# Patient Record
Sex: MALE | Race: WHITE | NOT HISPANIC OR LATINO | Employment: FULL TIME | URBAN - METROPOLITAN AREA
[De-identification: names, ages, dates, MRNs, and addresses within clinical notes are randomized per-mention and may not be internally consistent; named-entity substitution may affect disease eponyms.]

---

## 2022-02-20 ENCOUNTER — HOSPITAL ENCOUNTER (EMERGENCY)
Facility: HOSPITAL | Age: 32
Discharge: HOME | End: 2022-02-20
Attending: SURGERY
Payer: COMMERCIAL

## 2022-02-20 ENCOUNTER — APPOINTMENT (EMERGENCY)
Dept: CT IMAGING | Facility: HOSPITAL | Age: 32
End: 2022-02-20
Payer: COMMERCIAL

## 2022-02-20 ENCOUNTER — APPOINTMENT (EMERGENCY)
Dept: RADIOLOGY | Facility: HOSPITAL | Age: 32
End: 2022-02-20
Payer: COMMERCIAL

## 2022-02-20 ENCOUNTER — ANESTHESIA (EMERGENCY)
Dept: PERIOP | Facility: HOSPITAL | Age: 32
End: 2022-02-20
Payer: COMMERCIAL

## 2022-02-20 ENCOUNTER — ANESTHESIA EVENT (EMERGENCY)
Dept: PERIOP | Facility: HOSPITAL | Age: 32
End: 2022-02-20
Payer: COMMERCIAL

## 2022-02-20 VITALS
RESPIRATION RATE: 18 BRPM | SYSTOLIC BLOOD PRESSURE: 118 MMHG | TEMPERATURE: 97.9 F | HEART RATE: 55 BPM | OXYGEN SATURATION: 98 % | WEIGHT: 222.22 LBS | DIASTOLIC BLOOD PRESSURE: 63 MMHG

## 2022-02-20 DIAGNOSIS — S02.2XXA FRACTURE OF NASAL BONES, INITIAL ENCOUNTER FOR CLOSED FRACTURE: Primary | ICD-10-CM

## 2022-02-20 DIAGNOSIS — S09.93XA FACIAL INJURY, INITIAL ENCOUNTER: ICD-10-CM

## 2022-02-20 LAB
BASE EXCESS BLDA CALC-SCNC: 2 MMOL/L (ref -2–3)
GLUCOSE SERPL-MCNC: 135 MG/DL (ref 65–140)
HCO3 BLDA-SCNC: 26 MMOL/L (ref 24–30)
HCT VFR BLD CALC: 44 % (ref 36.5–49.3)
HGB BLDA-MCNC: 15 G/DL (ref 12–17)
PCO2 BLD: 27 MMOL/L (ref 21–32)
PCO2 BLD: 36.8 MM HG (ref 42–50)
PH BLD: 7.46 [PH] (ref 7.3–7.4)
PO2 BLD: 24 MM HG (ref 35–45)
POTASSIUM BLD-SCNC: 4 MMOL/L (ref 3.5–5.3)
SAO2 % BLD FROM PO2: 46 % (ref 60–85)
SODIUM BLD-SCNC: 142 MMOL/L (ref 136–145)
SPECIMEN SOURCE: ABNORMAL

## 2022-02-20 PROCEDURE — 82803 BLOOD GASES ANY COMBINATION: CPT

## 2022-02-20 PROCEDURE — 85014 HEMATOCRIT: CPT

## 2022-02-20 PROCEDURE — 84295 ASSAY OF SERUM SODIUM: CPT

## 2022-02-20 PROCEDURE — 84132 ASSAY OF SERUM POTASSIUM: CPT

## 2022-02-20 PROCEDURE — 82947 ASSAY GLUCOSE BLOOD QUANT: CPT

## 2022-02-20 PROCEDURE — 96361 HYDRATE IV INFUSION ADD-ON: CPT

## 2022-02-20 PROCEDURE — 99284 EMERGENCY DEPT VISIT MOD MDM: CPT | Performed by: SURGERY

## 2022-02-20 PROCEDURE — 96374 THER/PROPH/DIAG INJ IV PUSH: CPT

## 2022-02-20 PROCEDURE — 70486 CT MAXILLOFACIAL W/O DYE: CPT

## 2022-02-20 PROCEDURE — 99284 EMERGENCY DEPT VISIT MOD MDM: CPT

## 2022-02-20 PROCEDURE — 70450 CT HEAD/BRAIN W/O DYE: CPT

## 2022-02-20 RX ORDER — MIDAZOLAM HYDROCHLORIDE 2 MG/2ML
INJECTION, SOLUTION INTRAMUSCULAR; INTRAVENOUS AS NEEDED
Status: DISCONTINUED | OUTPATIENT
Start: 2022-02-20 | End: 2022-02-20

## 2022-02-20 RX ORDER — NEOSTIGMINE METHYLSULFATE 1 MG/ML
INJECTION INTRAVENOUS AS NEEDED
Status: DISCONTINUED | OUTPATIENT
Start: 2022-02-20 | End: 2022-02-20

## 2022-02-20 RX ORDER — LIDOCAINE HYDROCHLORIDE 10 MG/ML
INJECTION, SOLUTION EPIDURAL; INFILTRATION; INTRACAUDAL; PERINEURAL AS NEEDED
Status: DISCONTINUED | OUTPATIENT
Start: 2022-02-20 | End: 2022-02-20

## 2022-02-20 RX ORDER — FENTANYL CITRATE/PF 50 MCG/ML
25 SYRINGE (ML) INJECTION
Status: DISCONTINUED | OUTPATIENT
Start: 2022-02-20 | End: 2022-02-20 | Stop reason: HOSPADM

## 2022-02-20 RX ORDER — SODIUM CHLORIDE, SODIUM LACTATE, POTASSIUM CHLORIDE, CALCIUM CHLORIDE 600; 310; 30; 20 MG/100ML; MG/100ML; MG/100ML; MG/100ML
100 INJECTION, SOLUTION INTRAVENOUS CONTINUOUS
Status: DISCONTINUED | OUTPATIENT
Start: 2022-02-20 | End: 2022-02-20 | Stop reason: HOSPADM

## 2022-02-20 RX ORDER — ONDANSETRON 2 MG/ML
4 INJECTION INTRAMUSCULAR; INTRAVENOUS ONCE AS NEEDED
Status: DISCONTINUED | OUTPATIENT
Start: 2022-02-20 | End: 2022-02-20 | Stop reason: HOSPADM

## 2022-02-20 RX ORDER — FENTANYL CITRATE 50 UG/ML
INJECTION, SOLUTION INTRAMUSCULAR; INTRAVENOUS AS NEEDED
Status: DISCONTINUED | OUTPATIENT
Start: 2022-02-20 | End: 2022-02-20

## 2022-02-20 RX ORDER — MAGNESIUM HYDROXIDE 1200 MG/15ML
LIQUID ORAL AS NEEDED
Status: DISCONTINUED | OUTPATIENT
Start: 2022-02-20 | End: 2022-02-20 | Stop reason: HOSPADM

## 2022-02-20 RX ORDER — AMOXICILLIN AND CLAVULANATE POTASSIUM 875; 125 MG/1; MG/1
1 TABLET, FILM COATED ORAL EVERY 12 HOURS SCHEDULED
Qty: 14 TABLET | Refills: 0 | Status: SHIPPED | OUTPATIENT
Start: 2022-02-20 | End: 2022-02-27

## 2022-02-20 RX ORDER — KETOROLAC TROMETHAMINE 30 MG/ML
INJECTION, SOLUTION INTRAMUSCULAR; INTRAVENOUS AS NEEDED
Status: DISCONTINUED | OUTPATIENT
Start: 2022-02-20 | End: 2022-02-20

## 2022-02-20 RX ORDER — GLYCOPYRROLATE 0.2 MG/ML
INJECTION INTRAMUSCULAR; INTRAVENOUS AS NEEDED
Status: DISCONTINUED | OUTPATIENT
Start: 2022-02-20 | End: 2022-02-20

## 2022-02-20 RX ORDER — ACETAMINOPHEN 325 MG/1
975 TABLET ORAL EVERY 8 HOURS PRN
Refills: 0 | Status: CANCELLED
Start: 2022-02-20

## 2022-02-20 RX ORDER — PROPOFOL 10 MG/ML
INJECTION, EMULSION INTRAVENOUS AS NEEDED
Status: DISCONTINUED | OUTPATIENT
Start: 2022-02-20 | End: 2022-02-20

## 2022-02-20 RX ORDER — IBUPROFEN 600 MG/1
600 TABLET ORAL EVERY 6 HOURS PRN
Qty: 30 TABLET | Refills: 0
Start: 2022-02-20 | End: 2022-03-02

## 2022-02-20 RX ORDER — ONDANSETRON 2 MG/ML
4 INJECTION INTRAMUSCULAR; INTRAVENOUS EVERY 6 HOURS PRN
Status: DISCONTINUED | OUTPATIENT
Start: 2022-02-20 | End: 2022-02-20 | Stop reason: HOSPADM

## 2022-02-20 RX ORDER — DEXAMETHASONE SODIUM PHOSPHATE 10 MG/ML
INJECTION, SOLUTION INTRAMUSCULAR; INTRAVENOUS AS NEEDED
Status: DISCONTINUED | OUTPATIENT
Start: 2022-02-20 | End: 2022-02-20

## 2022-02-20 RX ORDER — ACETAMINOPHEN 325 MG/1
975 TABLET ORAL ONCE
Status: COMPLETED | OUTPATIENT
Start: 2022-02-20 | End: 2022-02-20

## 2022-02-20 RX ORDER — DEXMEDETOMIDINE HYDROCHLORIDE 100 UG/ML
INJECTION, SOLUTION INTRAVENOUS AS NEEDED
Status: DISCONTINUED | OUTPATIENT
Start: 2022-02-20 | End: 2022-02-20

## 2022-02-20 RX ORDER — ONDANSETRON 2 MG/ML
INJECTION INTRAMUSCULAR; INTRAVENOUS AS NEEDED
Status: DISCONTINUED | OUTPATIENT
Start: 2022-02-20 | End: 2022-02-20

## 2022-02-20 RX ORDER — ROCURONIUM BROMIDE 10 MG/ML
INJECTION, SOLUTION INTRAVENOUS AS NEEDED
Status: DISCONTINUED | OUTPATIENT
Start: 2022-02-20 | End: 2022-02-20

## 2022-02-20 RX ORDER — OXYMETAZOLINE HYDROCHLORIDE 0.05 G/100ML
SPRAY NASAL AS NEEDED
Status: DISCONTINUED | OUTPATIENT
Start: 2022-02-20 | End: 2022-02-20 | Stop reason: HOSPADM

## 2022-02-20 RX ORDER — LIDOCAINE HYDROCHLORIDE AND EPINEPHRINE 10; 10 MG/ML; UG/ML
INJECTION, SOLUTION INFILTRATION; PERINEURAL AS NEEDED
Status: DISCONTINUED | OUTPATIENT
Start: 2022-02-20 | End: 2022-02-20 | Stop reason: HOSPADM

## 2022-02-20 RX ADMIN — SODIUM CHLORIDE, SODIUM LACTATE, POTASSIUM CHLORIDE, AND CALCIUM CHLORIDE: .6; .31; .03; .02 INJECTION, SOLUTION INTRAVENOUS at 17:00

## 2022-02-20 RX ADMIN — MIDAZOLAM HYDROCHLORIDE 2 MG: 1 INJECTION, SOLUTION INTRAMUSCULAR; INTRAVENOUS at 16:57

## 2022-02-20 RX ADMIN — PROPOFOL 50 MG: 10 INJECTION, EMULSION INTRAVENOUS at 17:05

## 2022-02-20 RX ADMIN — KETOROLAC TROMETHAMINE 30 MG: 30 INJECTION, SOLUTION INTRAMUSCULAR at 17:26

## 2022-02-20 RX ADMIN — FENTANYL CITRATE 50 MCG: 50 INJECTION, SOLUTION INTRAMUSCULAR; INTRAVENOUS at 17:03

## 2022-02-20 RX ADMIN — DEXMEDETOMIDINE HYDROCHLORIDE 8 MCG: 100 INJECTION, SOLUTION INTRAVENOUS at 17:08

## 2022-02-20 RX ADMIN — ACETAMINOPHEN 975 MG: 325 TABLET, FILM COATED ORAL at 18:44

## 2022-02-20 RX ADMIN — DEXAMETHASONE SODIUM PHOSPHATE 10 MG: 10 INJECTION, SOLUTION INTRAMUSCULAR; INTRAVENOUS at 17:08

## 2022-02-20 RX ADMIN — PROPOFOL 50 MG: 10 INJECTION, EMULSION INTRAVENOUS at 17:31

## 2022-02-20 RX ADMIN — ONDANSETRON 4 MG: 2 INJECTION INTRAMUSCULAR; INTRAVENOUS at 17:09

## 2022-02-20 RX ADMIN — PROPOFOL 200 MG: 10 INJECTION, EMULSION INTRAVENOUS at 17:03

## 2022-02-20 RX ADMIN — SODIUM CHLORIDE, SODIUM LACTATE, POTASSIUM CHLORIDE, AND CALCIUM CHLORIDE 100 ML/HR: .6; .31; .03; .02 INJECTION, SOLUTION INTRAVENOUS at 13:25

## 2022-02-20 RX ADMIN — NEOSTIGMINE METHYLSULFATE 5 MG: 1 INJECTION INTRAVENOUS at 17:31

## 2022-02-20 RX ADMIN — ROCURONIUM BROMIDE 30 MG: 10 SOLUTION INTRAVENOUS at 17:03

## 2022-02-20 RX ADMIN — GLYCOPYRROLATE 0.6 MG: 0.2 INJECTION, SOLUTION INTRAMUSCULAR; INTRAVENOUS at 17:31

## 2022-02-20 RX ADMIN — FENTANYL CITRATE 50 MCG: 50 INJECTION, SOLUTION INTRAMUSCULAR; INTRAVENOUS at 17:06

## 2022-02-20 RX ADMIN — LIDOCAINE HYDROCHLORIDE 80 MG: 10 INJECTION, SOLUTION EPIDURAL; INFILTRATION; INTRACAUDAL; PERINEURAL at 17:03

## 2022-02-20 RX ADMIN — SODIUM CHLORIDE 3 G: 9 INJECTION, SOLUTION INTRAVENOUS at 17:00

## 2022-02-20 RX ADMIN — ONDANSETRON 4 MG: 2 INJECTION INTRAMUSCULAR; INTRAVENOUS at 13:27

## 2022-02-20 NOTE — OP NOTE
OPERATIVE REPORT    PATIENT NAME: Hoang Castillo    :  1990  MRN: [id number]  Pt Location: AN OR ROOM 03    SURGERY DATE: 2022    Surgeon(s) and Role:     * Belkis Santos DMD - Primary     * Calin Gardiner DDS - Assisting    Preop Diagnosis:  Facial injury, initial encounter [S09 93XA]    Post-Op Diagnosis Codes:     * Facial injury, initial encounter [S09 93XA]    Procedure(s) (LRB):  CLOSED REDUCTION  AND STABILIZATION OF BILATERAL NASAL BONE FRACTURES   CLOSED REDUCTION AND STABILIZATION OF NASAL SEPTAL FRACTURE    Specimen(s): None    Estimated Blood Loss: Minimal    Drains: None    Anesthesia Type: General    Operative Indications:  Facial injury, initial encounter [S09 93XA]    Operative Findings: Bilateral nasal bone and nasal septal fractures    Complications:   None    Indications for the procedure : 32 y o  male who presents to ED s/p facial trauma sustaining fracture of bilateral nasal bone and septum  On exam, nasal deformity with deviation to the right  CT facial bones reveals comminuted bilateral nasal bone and septal fractures with deviation to the right  Procedure and Technique :  The patient was greeted in the preoperative holding area by the oral and maxillofacial surgery team as well as anesthesia teams  All the risks benefits of the procedure were explained detail all questions have been answered consent had already been signed  Care was then handed back to anesthesia team where the patient was brought into the operating room placed in the supine position where he remained for the rest of the case  Anesthesia was able to establish an orotracheal intubation without any complications  Care was then handed back to the OMFS team     Patient was prepped and draped in a sterile manner timeout was performed in which the patient was correctly identified by name medical record number as well as to site of the procedure to be performed   Patient had received preoperative antibiotics  Once a timeout was completed patient was given 6 mL of local anesthesia with 1% lidocaine 1:100,000 epinephrine at the sites of bilateral V2 blocks as well as locally at the site of the nasal bones and intra-nasally within the mucosa  Afrin-soaked-soaked neuro-patties were placed in bilateral nares to aid in hemostasis  Afrin-soaked neuro patties were then removed nasal speculum was used to do an intranasal exam  At this point [facility] elevator was used to reduce the deviated nasal bones with good symmetry achieved  Once the nasal bones were reduced speculum was used to inspect bilateral nares found that the patient was hemostatic  Fuchs splints coated with bacitracin was used internal nasal packing  Next benzoin ointment was placed on the nasal dorsum followed by external nasal splint  Care was then handed back to the anesthesia team where the patient was extubated in the operating room without any complications and transferred to the postanesthesia care unit  I was present for the entire procedure       Patient Disposition:  PACU , hemodynamically stable and extubated and stable    SIGNATURE: Jessy Irving DMD  DATE: February 20, 2022  TIME: 6:03 PM

## 2022-02-20 NOTE — CONSULTS
Oral and Maxillofacial Surgery Consult    Pt seen 02/20/22 3:00 PM    Assessment  32 y o  male who presents to ED s/p facial trauma sustaining fracture of b/l nasal bone and septum  On exam, nasal deformity with deviation to the right  CT facial bones reveals comminuted b/l nasal bone and septal fractures with deviation to the right   Plan:  Add on for OR today for closed reduction of nasal/septal fractures under GA  - Analgesia as per primary team  - Rx abx x7d total course (augmentin 875-125mg BID PO)  - NPO/IVF for OR  - Sinus precautions: no nose blowing, no heavy lifting, avoid pressure to the area, head of bed elevated, decongestants as needed   - follow up at outpatient [facility] clinic -- patient can call [phone] to schedule an appointment for 5 days after discharge     D/w OMFS attg on call    Consults     HPI: 32 y o  male w no PMH  Pt presents to ED s/p hockey puck to the face whiel playing roller hockey this morning, complains of headache and difficulty breathing through nose consistent with injury  Pain 8/10  Pt reports no LOC, + headache, no change in occlusion/open bite, no tooth pain/trauma, no rhinorrhea, no otorrhea, no vision changes, no blurry/double vision  Pt denies fever, chills, nausea, shortness of breath, neck pain  Last PO intake ~10:30AM      PMH:   No past medical history on file  Allergies:   Not on File    Meds:     Current Facility-Administered Medications:     lactated ringers infusion, 100 mL/hr, Intravenous, Continuous, Olivia [place], CRNP, Last Rate: 100 mL/hr at 02/20/22 1325, 100 mL/hr at 02/20/22 1325    ondansetron [facility] injection 4 mg, 4 mg, Intravenous, Q6H PRN, Olivia [place], CRNP, 4 mg at 02/20/22 1327  No current outpatient medications on file  PSH:   No past surgical history on file  No family history on file  Review of Systems   HENT: Positive for facial swelling and nosebleeds   Negative for dental problem, rhinorrhea, sinus pressure and trouble swallowing  Eyes: Negative  Respiratory: Negative  Cardiovascular: Negative  Gastrointestinal: Negative  Endocrine: Negative  Musculoskeletal: Negative  Skin: Negative  Allergic/Immunologic: Negative  Neurological: Positive for headaches  Hematological: Negative  Psychiatric/Behavioral: Negative  All other systems reviewed and are negative  Temp:  [96 8 °F (36 °C)-98 2 °F (36 8 °C)] 98 2 °F (36 8 °C)  HR:  [65-95] 72  Resp:  [18-20] 18  BP: (120-129)/(64-90) 121/64  SpO2:  [96 %-100 %] 100 %     No intake or output data in the 24 hours ending 02/20/22 1500     Physical Exam:  Gen: AAOx3  NAD  CVS: RRR  Resp:  unlabored on RA  Neuro: bilateral CN V1-V3 intact  bilateral CN VII grossly intact  HEENT:   Head: mild nasal facial swelling consistent with injury  Small facial laceration/abrasion over bridge of nose, unable to fully assess bedside secondary to pain and dry blood  Eye: EOM intact  PERRL  no subconjunctival hemorrhage  No periorbital ecchymosis/edema  No exophthalmos, enophthalmos, chemosis  Visual acuity grossly intact  Nose:  right nasal dorsum deviation  No septal hematoma  + dried blood in nares  Intraoral: DARRELL ~40mm  Dentition inact  Occlusion stable  No segmental mobility  No ecchymosis in vestibule/FOM  FOM soft, non-elevated, non-tender  Uvula midline  Lab Results: I have personally reviewed pertinent lab results  Imaging: I have personally reviewed pertinent reports  EKG, Pathology, and Other Studies: I have personally reviewed pertinent reports  Counseling / Coordination of Care  Total floor / unit time spent today 30 minutes  Greater than 50% of total time was spent with the patient and / or family counseling and / or coordination of care

## 2022-02-20 NOTE — ANESTHESIA PREPROCEDURE EVALUATION
Procedure:  CLOSED REDUCTION NASAL FRACTURE (N/A Nose)  History of opioid abuse  Relevant Problems   CARDIO (within normal limits)      ENDO (within normal limits)      GI/HEPATIC (within normal limits)      /RENAL (within normal limits)      MUSCULOSKELETAL (within normal limits)      NEURO/PSYCH (within normal limits)        Physical Exam    Airway    Mallampati score: II  TM Distance: >3 FB  Neck ROM: full     Dental   No notable dental hx     Cardiovascular  Rhythm: regular, Rate: normal,     Pulmonary  Breath sounds clear to auscultation,     Other Findings        Anesthesia Plan  ASA Score- 1 Emergent    Anesthesia Type-         Additional Monitors:   Airway Plan: ETT  Plan Factors-Exercise tolerance (METS): >4 METS  Chart reviewed  Existing labs reviewed  Patient is not a current smoker  Patient not instructed to abstain from smoking on day of procedure  Patient did not smoke on day of surgery          Induction-     Postoperative Plan-     Informed Consent-

## 2022-02-20 NOTE — ANESTHESIA POSTPROCEDURE EVALUATION
Post-Op Assessment Note    CV Status:  Stable  Pain Score: 0    Pain management: adequate     Mental Status:  Sleepy and arousable   Hydration Status:  Stable   PONV Controlled:  None   Airway Patency:  Patent      Post Op Vitals Reviewed: Yes      Staff: CRNA         No complications documented      BP   127/65   Temp   97 5   Pulse  79   Resp   16   SpO2   100

## 2022-02-20 NOTE — H&P
Catherine De [address] 1990, 32 y o  male MRN: [id number]  Unit/Bed#: [facility] Encounter: [id number]  Primary Care Provider: No primary care provider on file  Date and time admitted to hospital: 2/20/2022 12:02 PM    Facial trauma  Assessment & Plan  Patient was hit in the face with a hockey ball  - obvious nasal trauma with bleeding  - CTH and CT face pending final reads  - OMS consult ordered      H&P - Gia Beltre 32 y o  male MRN: [id number]  Unit/Bed#: [facility] Encounter: [id number]    Trauma Alert: Level B   Model of Arrival: Friend transported    Trauma Team: Attending Rob Vines and HUMBERTO Burgess  Consultants: CECELIA Carmona, Routine    Assessment/Plan   Active Problems / Assessment:   Nasal Bone fracture requiring operative fixation     Plan:   OMS consult, Pain control, NPO for now    History of Present Illness     Chief Complaint: Head ache, forehead pain  Bleeding from nose with feelings of blood pooling in back of mout  Mechanism:Other: Hit in face with Hockey ball     HPI:    Danae Richey is a 32 y o  male who presented to the hospital after having been hit in the face with a hockey ball  There was obvious nasal deformity, bleeding from bilateral nares  He complained of headache forehead pain and blood in the back of his throat  He is otherwise healthy  No past medical history  Past surgical history of carpal tunnel  Does state that he is allergic to narcotics and refuses any narcotic pain medication  Review of Systems   Constitutional: Negative  HENT: Negative  Eyes: Negative  Respiratory: Negative  Cardiovascular: Negative  Gastrointestinal: Negative  Genitourinary: Negative  Musculoskeletal: Negative  Neurological: Positive for dizziness and headaches  Negative for weakness  12-point, complete review of systems was reviewed and negative except as stated above       Historical Information Social History     Tobacco Use    Smoking status: Not on file    Smokeless tobacco: Not on file   Substance Use Topics    Alcohol use: Not on file    Drug use: Not on file     Immunization History   Administered Date(s) Administered    COVID-19 MODERNA VACC 0 5 ML IM 04/22/2021, 05/20/2021     Last Tetanus: Unknown   Family History: Non-contributory     Meds/Allergies   On no medications  Allergies have not been reviewed; Not on File    Objective   Initial Vitals:   Temperature: (!) 96 8 °F (36 °C) (02/20/22 1205)  Pulse: 95 (02/20/22 1205)  Respirations: 20 (02/20/22 1205)  Blood Pressure: 129/90 (02/20/22 1205)    Primary Survey:   Airway:        Status: patent;        Pre-hospital Interventions: none        Hospital Interventions: none  Breathing:        Pre-hospital Interventions: none       Effort: normal       Right breath sounds: normal       Left breath sounds: normal  Circulation:        Rhythm: regular       Rate: regular   Right Pulses Left Pulses    R radial: 2+  R femoral: 2+  R pedal: 2+  R carotid: 2+   L radial: 2+  L femoral: 2+  L pedal: 2+  L carotid: 2+     Disability:        GCS: Eye: 4; Verbal: 5 Motor: 6 Total: 15       Right Pupil: round;  reactive         Left Pupil:  round;  reactive      R Motor Strength L Motor Strength    R : 5/5  R dorsiflex: 5/5  R plantarflex: 5/5 L : 5/5  L dorsiflex: 5/5  L plantarflex: 5/5        Sensory:  No sensory deficit  Exposure:       Completed: Yes      Secondary Survey:  Physical Exam  Constitutional:       General: He is not in acute distress  HENT:      Head:      Comments: Nasal bone deformity with bleeding       Right Ear: Ear canal and external ear normal       Left Ear: Ear canal and external ear normal       Nose: Congestion present        Comments: Blood bilateral nares     Mouth/Throat:      Mouth: Mucous membranes are moist    Eyes:      Conjunctiva/sclera: Conjunctivae normal       Pupils: Pupils are equal, round, and reactive to light  Cardiovascular:      Rate and Rhythm: Normal rate and regular rhythm  Pulses: Normal pulses  Heart sounds: Normal heart sounds  Pulmonary:      Effort: Pulmonary effort is normal       Breath sounds: Normal breath sounds  Abdominal:      General: Abdomen is flat  Bowel sounds are normal       Palpations: Abdomen is soft  Musculoskeletal:         General: Normal range of motion  Cervical back: Normal range of motion and neck supple  No rigidity or tenderness  Skin:     General: Skin is warm  Capillary Refill: Capillary refill takes less than 2 seconds  Findings: No bruising  Neurological:      General: No focal deficit present  Mental Status: He is alert and oriented to person, place, and time  Psychiatric:         Mood and Affect: Mood normal          Invasive Devices  Report    Peripheral Intravenous Line            Peripheral IV 02/20/22 Left Antecubital <1 day              Lab Results:   Results: I have personally reviewed pertinent reports   , BMP/CMP:   Lab Results   Component Value Date    CO2 27 02/20/2022    GLUCOSE 135 02/20/2022    and ISTAT: No components found for: VBG    Imaging Results: I have personally reviewed pertinent reports  Chest Xray(s): N/A   FAST exam(s): N/A   CT Scan(s): positive for acute traumatic findings: 1  Comminuted mildly displaced bilateral nasal fractures as well as nasal septum as described  Additional Xray(s): N/A     Other Studies: NA    Code Status: No Order  Advance Directive and Living Will:      Power of :    POLST:    I have spent 40 minutes with Patient  today in which greater than 50% of this time was spent in counseling/coordination of care regarding Diagnostic results, treatment

## 2022-02-20 NOTE — DISCHARGE INSTRUCTIONS
Trauma Discharge Instructions:    Please follow-up as instructed  If you need a follow-up appointment, please call the office when you leave to schedule an appointment  Activity:  - You may resume activity as tolerated  - Walking and normal light activities are encouraged  - Normal daily activities including climbing steps are okay  - No driving for 24 hours after surgery    Sinus Precautions  No blowing nose, sneeze with mouth open, no bearing down forcefully, no strenuous lifting and/or exercise  No activity that will force air into the sinuses  Do not use straws for 4 weeks  Nasal packing to be removed on 2/25 at [facility] follow up    Return to work:    - You may return to work once cleared by the [facility]  Diet:    - You may resume your normal diet  Medications:  - You should continue your current medication regimen after discharge unless otherwise instructed  Please refer to your discharge medication list for further details  - Please take the pain medications as directed  - You are encouraged to use non-narcotic pain medications first and whenever possible  Reserve the use of narcotic pain medication for moderate to severe pain not controlled by non-narcotic medications  Additional Instructions:  - May shower daily   - If you have any questions or concerns after discharge please call the office   - Call office or return to ER if fever greater than 101, chills, persistent nausea/vomiting, worsening/uncontrollable pain, develop productive cough, increasing shortness of breath, difficulty breathing, and/or increasing redness or purulent/foul smelling drainage from incision(s)

## 2022-02-20 NOTE — ASSESSMENT & PLAN NOTE
Patient was hit in the face with a hockey ball  - obvious nasal trauma with bleeding  - CTH and CT face pending final reads: 1  Comminuted mildly displaced bilateral nasal fractures as well as nasal septum as described  2   Slight increased attenuation left frontal pole likely artifactual with tiny amount of hemorrhage not entirely excludable  See above    - OMS consult ordered  - Likely OR today

## 2024-10-21 DIAGNOSIS — E29.1 HYPOGONADISM IN MALE: Primary | ICD-10-CM

## 2024-10-23 ENCOUNTER — APPOINTMENT (OUTPATIENT)
Dept: LAB | Facility: CLINIC | Age: 34
End: 2024-10-23
Payer: COMMERCIAL

## 2024-10-23 DIAGNOSIS — E29.1 HYPOGONADISM IN MALE: ICD-10-CM

## 2024-10-23 LAB
LIQUEFACTION TIME SMN: 60 MIN
PH SMN: 7.9 [PH] (ref 7.2–8.6)
SEX ABSTIN DURATION TIME PATIENT: 4 D
SPECIMEN VOL SMN: 3.6 ML (ref 1–5)
SPERM # SMN: 0 MILLION/EJACULATION (ref 40–1000)
SPERM MOTILE SMN QL MICRO: 0 %
SPERM SMN: 0 /ML (ref 20–999)
VISC SMN: 3 CP (ref 3–4)
WBC SMN QL: 2 HPF

## 2024-10-23 PROCEDURE — 89320 SEMEN ANAL VOL/COUNT/MOT: CPT

## 2024-10-23 PROCEDURE — 82757 ASSAY OF SEMEN FRUCTOSE: CPT

## 2024-10-23 PROCEDURE — 36415 COLL VENOUS BLD VENIPUNCTURE: CPT

## 2024-10-25 LAB — MISCELLANEOUS LAB TEST RESULT: NORMAL

## (undated) DEVICE — NEEDLE 25G X 1 1/2

## (undated) DEVICE — NEURO PATTIES 1/2 X 1 1/2

## (undated) DEVICE — SPONGE GELFILM 2.5 X 5 CM

## (undated) DEVICE — STERILE NASAL PACK: Brand: CARDINAL HEALTH

## (undated) DEVICE — GLOVE INDICATOR PI UNDERGLOVE SZ 8 BLUE

## (undated) DEVICE — SPLINT THE DENVER SM

## (undated) DEVICE — 3M™ STERI-STRIP™ REINFORCED ADHESIVE SKIN CLOSURES, R1547, 1/2 IN X 4 IN (12 MM X 100 MM), 6 STRIPS/ENVELOPE: Brand: 3M™ STERI-STRIP™

## (undated) DEVICE — GLOVE SRG BIOGEL ECLIPSE 7.5

## (undated) DEVICE — SPLINT 1524055 DOYLE II AIRWAY SET: Brand: DOYLE II ™

## (undated) DEVICE — SPLINT 1528121 5PK EXTERNAL NASAL MEDIUM